# Patient Record
Sex: MALE | Race: WHITE | NOT HISPANIC OR LATINO
[De-identification: names, ages, dates, MRNs, and addresses within clinical notes are randomized per-mention and may not be internally consistent; named-entity substitution may affect disease eponyms.]

---

## 2020-06-05 PROBLEM — Z00.00 ENCOUNTER FOR PREVENTIVE HEALTH EXAMINATION: Status: ACTIVE | Noted: 2020-06-05

## 2020-06-10 ENCOUNTER — NON-APPOINTMENT (OUTPATIENT)
Age: 45
End: 2020-06-10

## 2020-06-10 ENCOUNTER — APPOINTMENT (OUTPATIENT)
Dept: CARDIOLOGY | Facility: CLINIC | Age: 45
End: 2020-06-10
Payer: COMMERCIAL

## 2020-06-10 VITALS
HEIGHT: 70 IN | SYSTOLIC BLOOD PRESSURE: 111 MMHG | WEIGHT: 200 LBS | DIASTOLIC BLOOD PRESSURE: 70 MMHG | HEART RATE: 47 BPM | OXYGEN SATURATION: 97 % | BODY MASS INDEX: 28.63 KG/M2

## 2020-06-10 DIAGNOSIS — R07.9 CHEST PAIN, UNSPECIFIED: ICD-10-CM

## 2020-06-10 PROCEDURE — 93000 ELECTROCARDIOGRAM COMPLETE: CPT

## 2020-06-10 PROCEDURE — 99205 OFFICE O/P NEW HI 60 MIN: CPT | Mod: 25

## 2020-06-10 NOTE — PHYSICAL EXAM
[General Appearance - Well Developed] : well developed [Normal Appearance] : normal appearance [Well Groomed] : well groomed [General Appearance - Well Nourished] : well nourished [No Deformities] : no deformities [General Appearance - In No Acute Distress] : no acute distress [Normal Conjunctiva] : the conjunctiva exhibited no abnormalities [Eyelids - No Xanthelasma] : the eyelids demonstrated no xanthelasmas [Normal Oral Mucosa] : normal oral mucosa [No Oral Pallor] : no oral pallor [No Oral Cyanosis] : no oral cyanosis [Normal Jugular Venous A Waves Present] : normal jugular venous A waves present [Normal Jugular Venous V Waves Present] : normal jugular venous V waves present [No Jugular Venous Grimaldo A Waves] : no jugular venous grimaldo A waves [Heart Rate And Rhythm] : heart rate and rhythm were normal [Heart Sounds] : normal S1 and S2 [Murmurs] : no murmurs present [Respiration, Rhythm And Depth] : normal respiratory rhythm and effort [Exaggerated Use Of Accessory Muscles For Inspiration] : no accessory muscle use [Auscultation Breath Sounds / Voice Sounds] : lungs were clear to auscultation bilaterally [Abdomen Soft] : soft [Abdomen Tenderness] : non-tender [Abdomen Mass (___ Cm)] : no abdominal mass palpated [Abnormal Walk] : normal gait [Gait - Sufficient For Exercise Testing] : the gait was sufficient for exercise testing [Nail Clubbing] : no clubbing of the fingernails [Cyanosis, Localized] : no localized cyanosis [Petechial Hemorrhages (___cm)] : no petechial hemorrhages [Skin Color & Pigmentation] : normal skin color and pigmentation [] : no rash [No Venous Stasis] : no venous stasis [Skin Lesions] : no skin lesions [No Skin Ulcers] : no skin ulcer [No Xanthoma] : no  xanthoma was observed [Oriented To Time, Place, And Person] : oriented to person, place, and time [Affect] : the affect was normal [Mood] : the mood was normal [No Anxiety] : not feeling anxious

## 2020-06-11 LAB
ANION GAP SERPL CALC-SCNC: 15 MMOL/L
BASOPHILS # BLD AUTO: 0.02 K/UL
BASOPHILS NFR BLD AUTO: 0.3 %
BUN SERPL-MCNC: 18 MG/DL
CALCIUM SERPL-MCNC: 9.7 MG/DL
CHLORIDE SERPL-SCNC: 101 MMOL/L
CHOLEST SERPL-MCNC: 169 MG/DL
CHOLEST/HDLC SERPL: 3 RATIO
CO2 SERPL-SCNC: 24 MMOL/L
CREAT SERPL-MCNC: 0.98 MG/DL
EOSINOPHIL # BLD AUTO: 0.14 K/UL
EOSINOPHIL NFR BLD AUTO: 2.4 %
GLUCOSE SERPL-MCNC: 121 MG/DL
HCT VFR BLD CALC: 42.6 %
HDLC SERPL-MCNC: 57 MG/DL
HGB BLD-MCNC: 14.5 G/DL
IMM GRANULOCYTES NFR BLD AUTO: 0.3 %
LDLC SERPL CALC-MCNC: 100 MG/DL
LYMPHOCYTES # BLD AUTO: 1.89 K/UL
LYMPHOCYTES NFR BLD AUTO: 32.8 %
MAN DIFF?: NORMAL
MCHC RBC-ENTMCNC: 30.7 PG
MCHC RBC-ENTMCNC: 34 GM/DL
MCV RBC AUTO: 90.1 FL
MONOCYTES # BLD AUTO: 0.43 K/UL
MONOCYTES NFR BLD AUTO: 7.5 %
NEUTROPHILS # BLD AUTO: 3.26 K/UL
NEUTROPHILS NFR BLD AUTO: 56.7 %
PLATELET # BLD AUTO: 289 K/UL
POTASSIUM SERPL-SCNC: 5 MMOL/L
RBC # BLD: 4.73 M/UL
RBC # FLD: 14.2 %
SODIUM SERPL-SCNC: 140 MMOL/L
TRIGL SERPL-MCNC: 61 MG/DL
WBC # FLD AUTO: 5.76 K/UL

## 2020-06-18 ENCOUNTER — APPOINTMENT (OUTPATIENT)
Dept: CARDIOLOGY | Facility: CLINIC | Age: 45
End: 2020-06-18
Payer: COMMERCIAL

## 2020-06-18 PROCEDURE — 93306 TTE W/DOPPLER COMPLETE: CPT

## 2020-07-01 ENCOUNTER — APPOINTMENT (OUTPATIENT)
Dept: CARDIOLOGY | Facility: CLINIC | Age: 45
End: 2020-07-01

## 2020-07-01 ENCOUNTER — APPOINTMENT (OUTPATIENT)
Dept: INTERNAL MEDICINE | Facility: CLINIC | Age: 45
End: 2020-07-01
Payer: COMMERCIAL

## 2020-07-01 VITALS
SYSTOLIC BLOOD PRESSURE: 124 MMHG | TEMPERATURE: 98.5 F | DIASTOLIC BLOOD PRESSURE: 82 MMHG | BODY MASS INDEX: 28.35 KG/M2 | WEIGHT: 198 LBS | OXYGEN SATURATION: 97 % | HEART RATE: 63 BPM | RESPIRATION RATE: 18 BRPM | HEIGHT: 70 IN

## 2020-07-01 DIAGNOSIS — E66.3 OVERWEIGHT: ICD-10-CM

## 2020-07-01 DIAGNOSIS — J45.909 UNSPECIFIED ASTHMA, UNCOMPLICATED: ICD-10-CM

## 2020-07-01 DIAGNOSIS — K21.9 GASTRO-ESOPHAGEAL REFLUX DISEASE W/OUT ESOPHAGITIS: ICD-10-CM

## 2020-07-01 DIAGNOSIS — F11.20 OPIOID DEPENDENCE, UNCOMPLICATED: ICD-10-CM

## 2020-07-01 PROCEDURE — 99204 OFFICE O/P NEW MOD 45 MIN: CPT

## 2020-07-01 NOTE — HISTORY OF PRESENT ILLNESS
[de-identified] : This is the first visit for this 44-year-old male.  He has a past history of asthma, GERD.  He tells me he was told in the past that he had a "spot" on his lungs on chest x-ray.  It was approximately a few years ago.  I have asked the patient to bring in a copy of the disc and formal report for our review.  He is not having recent coughing, wheezing, dyspnea exertion, sputum production, or hemoptysis.  He is not having fever or chills.  Did smoke cigarettes for 10 pack years and quit 2 years ago.  He does vape and was counseled on discontinuing this altogether.  Does occasionally smoke marijuana.  He does tell me he used other drugs in the past.  He currently is on methadone maintenance.\shayla \shayla He says his tetanus vaccination is up-to-date.\shayla larkin Currently is working as an .  He has 2 children. [FreeTextEntry1] : 1st visit

## 2020-07-01 NOTE — PHYSICAL EXAM
[Well Nourished] : well nourished [No Acute Distress] : no acute distress [Normal Sclera/Conjunctiva] : normal sclera/conjunctiva [Well Developed] : well developed [PERRL] : pupils equal round and reactive to light [Normal Outer Ear/Nose] : the outer ears and nose were normal in appearance [Supple] : supple [No Lymphadenopathy] : no lymphadenopathy [No JVD] : no jugular venous distention [No Accessory Muscle Use] : no accessory muscle use [No Respiratory Distress] : no respiratory distress  [Thyroid Normal, No Nodules] : the thyroid was normal and there were no nodules present [Normal S1, S2] : normal S1 and S2 [Normal Rate] : normal rate  [Clear to Auscultation] : lungs were clear to auscultation bilaterally [Regular Rhythm] : with a regular rhythm [No Edema] : there was no peripheral edema [No Extremity Clubbing/Cyanosis] : no extremity clubbing/cyanosis [Soft] : abdomen soft [Non Tender] : non-tender [No HSM] : no HSM [Non-distended] : non-distended [Normal Anterior Cervical Nodes] : no anterior cervical lymphadenopathy [Normal Bowel Sounds] : normal bowel sounds [No Rash] : no rash [No Focal Deficits] : no focal deficits [Normal Affect] : the affect was normal [Normal Gait] : normal gait [Normal Insight/Judgement] : insight and judgment were intact [de-identified] : 1-2/6 jerson winters

## 2020-07-01 NOTE — ASSESSMENT
[FreeTextEntry1] : #1.  History of asthma.  Pt is recently asymptomatic.  This is mild intermittent.  He will use albuterol inhaler on an as-needed basis.\par \par #2  History of "spot on lung" on chest x-ray a few years ago.  Pt will bring in disc and formal report for our review.  I am also having the patient have a PA and lateral chest x-ray.\par \par #3 Overweight.  He was counseled on a healthy weight reduction diet today.  See above.\par \par #4 Patient currently vaping.  He was counseled on discontinuing this altogether.  Use nicotine lozenges or nicotine gum as needed for cravings.\par \par #5 Pt currently on methadone maintenance program. \par \par

## 2020-07-01 NOTE — COUNSELING
[Benefits of weight loss discussed] : Benefits of weight loss discussed [Encouraged to increase physical activity] : Encouraged to increase physical activity [FreeTextEntry2] : healthy foods [Decrease Portions] : decrease portions

## 2020-07-09 ENCOUNTER — RESULT REVIEW (OUTPATIENT)
Age: 45
End: 2020-07-09

## 2020-07-09 ENCOUNTER — APPOINTMENT (OUTPATIENT)
Dept: CARDIOLOGY | Facility: CLINIC | Age: 45
End: 2020-07-09

## 2020-07-09 ENCOUNTER — OUTPATIENT (OUTPATIENT)
Dept: OUTPATIENT SERVICES | Facility: HOSPITAL | Age: 45
LOS: 1 days | End: 2020-07-09
Payer: MEDICAID

## 2020-07-09 DIAGNOSIS — R07.9 CHEST PAIN, UNSPECIFIED: ICD-10-CM

## 2020-07-09 PROCEDURE — 75574 CT ANGIO HRT W/3D IMAGE: CPT | Mod: 26

## 2020-07-09 PROCEDURE — 75574 CT ANGIO HRT W/3D IMAGE: CPT

## 2020-07-09 NOTE — HISTORY OF PRESENT ILLNESS
[FreeTextEntry1] : 43 y/o w/ h/o substance abuse, no prior cardiac history, self-referred for evaluation of chest pain.\par \par Onset 2 weeks ago.  Initially sharp pain followed by dull ache.  Duration minutes.\par No relation to activity, usually occurs at rest.  No relation to position, palpation, respiration, eating.\par Jogs 2-3 times per week for 2-3 miles at 4-5 mph w/o any chest discomfort.\par No dyspnea, LE edema. no palpitations.  Occasional lightheadness c/w orthostasis, no syncope.\par \par No prior medical history.\par H/o cocaine use for 10-15 yrs quit 3 yrs ago.\par H/o heroin use for 10-15 yrs quit 1 yr ago.\par H/o smoking for 10-15 yrs quit 1 yr ago, now vapes.\par No family history of cardiac disease.\par \par

## 2020-07-09 NOTE — ASSESSMENT
[FreeTextEntry1] : Chest pain - atypical - likely noncardiac & musculoskeletal.  Has risk factors for cardiac disease including smoking & cocaine use.\par \par CT coronary angiography ordered.\par Echo ordered.\par \par Lipids, basic chem & CBC ordered.\par \par Telehealth visit in 3 weeks to review results of testing.\par ==============================\par Addendum Jun 11th:\par Labs reviewed.\par \par basic chem, CBC, lipids WNL.\par .\par ==============================\par Addendum Jun 26th:\par \par Reviewed echo:\par \par *Echo Jun '20: EF 55%, mild AR & MR\par ==============================\par Addendum  Jul 9th:\par \par Reviewed CT coronaries:\par *CT coronary angio Jul '19: no areas of stenosis, \par LCx is diminutive, observed calcium score=1.\par Morphologically normal heart, cardiac valves & pericardium normal.\par Aorta normal. "essentially normal coronary arteries w/o stenosis."

## 2023-06-06 ENCOUNTER — NON-APPOINTMENT (OUTPATIENT)
Age: 48
End: 2023-06-06

## 2023-06-08 ENCOUNTER — NON-APPOINTMENT (OUTPATIENT)
Age: 48
End: 2023-06-08

## 2023-06-08 ENCOUNTER — APPOINTMENT (OUTPATIENT)
Dept: CARDIOLOGY | Facility: CLINIC | Age: 48
End: 2023-06-08
Payer: COMMERCIAL

## 2023-06-08 VITALS
DIASTOLIC BLOOD PRESSURE: 75 MMHG | RESPIRATION RATE: 16 BRPM | OXYGEN SATURATION: 98 % | SYSTOLIC BLOOD PRESSURE: 126 MMHG | WEIGHT: 158 LBS | HEART RATE: 52 BPM | HEIGHT: 70 IN | BODY MASS INDEX: 22.62 KG/M2

## 2023-06-08 PROCEDURE — 93000 ELECTROCARDIOGRAM COMPLETE: CPT

## 2023-06-08 PROCEDURE — 99205 OFFICE O/P NEW HI 60 MIN: CPT | Mod: 25

## 2023-06-08 RX ORDER — METHADONE HYDROCHLORIDE 10 MG/5ML
10 SOLUTION ORAL
Refills: 0 | Status: ACTIVE | COMMUNITY

## 2023-06-08 NOTE — HISTORY OF PRESENT ILLNESS
[FreeTextEntry1] : requested visit for chest pain.\par \par seen in  for chest pain, echo & coronary CTA normal.\par \par 1st episode 2 weeks ago.\par Was gardening at time, digging dirt. substernal central\par bilat. across chest.  Also w/ dyspnea.\par Duration 15-20 min.  Gradually resolved. \par also w/ numbness in R arm.  Also w/ palpitations.\par \par Later that week went on bike ride felt beginning\par of discomfort\par 2nd episode 2 days ago on mon. 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

## 2023-06-08 NOTE — ASSESSMENT
[FreeTextEntry1] : \par A/P:\par \par *Atypical\par -sounds atypical likely musculoskeletal\par -Echo & CT coronary angio Jul '20 normal\par CTA reported "normal coronaries" w/ Ca score =1\par \par -Echo\par -EST \par \par Return afer testing to review results.

## 2023-07-06 ENCOUNTER — APPOINTMENT (OUTPATIENT)
Dept: CARDIOLOGY | Facility: CLINIC | Age: 48
End: 2023-07-06